# Patient Record
Sex: MALE | Race: WHITE | NOT HISPANIC OR LATINO | Employment: PART TIME | URBAN - METROPOLITAN AREA
[De-identification: names, ages, dates, MRNs, and addresses within clinical notes are randomized per-mention and may not be internally consistent; named-entity substitution may affect disease eponyms.]

---

## 2023-05-09 PROBLEM — R41.840 DIFFICULTY CONCENTRATING: Status: ACTIVE | Noted: 2023-05-09

## 2023-05-09 PROBLEM — S61.219A FINGER LACERATION, INITIAL ENCOUNTER: Status: ACTIVE | Noted: 2023-05-09

## 2023-05-09 PROBLEM — F41.9 ANXIETY: Status: ACTIVE | Noted: 2023-05-09

## 2023-05-09 PROBLEM — R53.83 FATIGUE: Status: ACTIVE | Noted: 2023-05-09

## 2023-05-09 PROBLEM — G47.00 INSOMNIA: Status: ACTIVE | Noted: 2023-05-09

## 2023-05-09 PROBLEM — F90.9 ADHD: Status: ACTIVE | Noted: 2023-05-09

## 2023-05-09 PROBLEM — F32.A DEPRESSION: Status: ACTIVE | Noted: 2023-05-09

## 2023-05-09 RX ORDER — SERTRALINE HYDROCHLORIDE 50 MG/1
1 TABLET, FILM COATED ORAL DAILY
COMMUNITY
Start: 2021-01-20 | End: 2024-01-17 | Stop reason: ALTCHOICE

## 2023-05-09 RX ORDER — HYDROXYZINE HYDROCHLORIDE 25 MG/1
1 TABLET, FILM COATED ORAL NIGHTLY
COMMUNITY
Start: 2021-03-03 | End: 2024-01-17 | Stop reason: ALTCHOICE

## 2023-05-09 RX ORDER — METHYLPHENIDATE HYDROCHLORIDE 18 MG/1
TABLET ORAL
COMMUNITY
Start: 2021-03-03 | End: 2024-01-17 | Stop reason: ALTCHOICE

## 2023-05-10 ENCOUNTER — APPOINTMENT (OUTPATIENT)
Dept: PRIMARY CARE | Facility: CLINIC | Age: 23
End: 2023-05-10

## 2024-01-17 ENCOUNTER — LAB (OUTPATIENT)
Dept: LAB | Facility: LAB | Age: 24
End: 2024-01-17
Payer: OTHER GOVERNMENT

## 2024-01-17 ENCOUNTER — OFFICE VISIT (OUTPATIENT)
Dept: PRIMARY CARE | Facility: CLINIC | Age: 24
End: 2024-01-17
Payer: OTHER GOVERNMENT

## 2024-01-17 VITALS
DIASTOLIC BLOOD PRESSURE: 70 MMHG | HEART RATE: 93 BPM | TEMPERATURE: 97.4 F | HEIGHT: 71 IN | BODY MASS INDEX: 22.96 KG/M2 | WEIGHT: 164 LBS | OXYGEN SATURATION: 97 % | SYSTOLIC BLOOD PRESSURE: 108 MMHG

## 2024-01-17 DIAGNOSIS — Z00.00 HEALTHCARE MAINTENANCE: ICD-10-CM

## 2024-01-17 DIAGNOSIS — R09.82 POSTNASAL DRIP: ICD-10-CM

## 2024-01-17 DIAGNOSIS — Q85.01 TYPE 1 NEUROFIBROMATOSIS (MULTI): ICD-10-CM

## 2024-01-17 DIAGNOSIS — R05.3 PERSISTENT COUGH: Primary | ICD-10-CM

## 2024-01-17 LAB
ALBUMIN SERPL BCP-MCNC: 4.6 G/DL (ref 3.4–5)
ALP SERPL-CCNC: 56 U/L (ref 33–120)
ALT SERPL W P-5'-P-CCNC: 21 U/L (ref 10–52)
ANION GAP SERPL CALC-SCNC: 13 MMOL/L (ref 10–20)
AST SERPL W P-5'-P-CCNC: 17 U/L (ref 9–39)
BILIRUB SERPL-MCNC: 0.6 MG/DL (ref 0–1.2)
BUN SERPL-MCNC: 27 MG/DL (ref 6–23)
CALCIUM SERPL-MCNC: 9.2 MG/DL (ref 8.6–10.3)
CHLORIDE SERPL-SCNC: 108 MMOL/L (ref 98–107)
CHOLEST SERPL-MCNC: 138 MG/DL (ref 0–199)
CHOLESTEROL/HDL RATIO: 3.9
CO2 SERPL-SCNC: 23 MMOL/L (ref 21–32)
CREAT SERPL-MCNC: 0.91 MG/DL (ref 0.5–1.3)
EGFRCR SERPLBLD CKD-EPI 2021: >90 ML/MIN/1.73M*2
ERYTHROCYTE [DISTWIDTH] IN BLOOD BY AUTOMATED COUNT: 12.8 % (ref 11.5–14.5)
GLUCOSE SERPL-MCNC: 76 MG/DL (ref 74–99)
HCT VFR BLD AUTO: 50.2 % (ref 41–52)
HDLC SERPL-MCNC: 35.7 MG/DL
HGB BLD-MCNC: 16.9 G/DL (ref 13.5–17.5)
LDLC SERPL CALC-MCNC: 89 MG/DL
MCH RBC QN AUTO: 29.8 PG (ref 26–34)
MCHC RBC AUTO-ENTMCNC: 33.7 G/DL (ref 32–36)
MCV RBC AUTO: 89 FL (ref 80–100)
NON HDL CHOLESTEROL: 102 MG/DL (ref 0–149)
NRBC BLD-RTO: 0 /100 WBCS (ref 0–0)
PLATELET # BLD AUTO: 277 X10*3/UL (ref 150–450)
POTASSIUM SERPL-SCNC: 4.5 MMOL/L (ref 3.5–5.3)
PROT SERPL-MCNC: 6.8 G/DL (ref 6.4–8.2)
RBC # BLD AUTO: 5.67 X10*6/UL (ref 4.5–5.9)
SODIUM SERPL-SCNC: 139 MMOL/L (ref 136–145)
TRIGL SERPL-MCNC: 65 MG/DL (ref 0–149)
VLDL: 13 MG/DL (ref 0–40)
WBC # BLD AUTO: 7.2 X10*3/UL (ref 4.4–11.3)

## 2024-01-17 PROCEDURE — 80061 LIPID PANEL: CPT

## 2024-01-17 PROCEDURE — 80053 COMPREHEN METABOLIC PANEL: CPT

## 2024-01-17 PROCEDURE — 85027 COMPLETE CBC AUTOMATED: CPT

## 2024-01-17 PROCEDURE — 36415 COLL VENOUS BLD VENIPUNCTURE: CPT

## 2024-01-17 PROCEDURE — 1036F TOBACCO NON-USER: CPT | Performed by: NURSE PRACTITIONER

## 2024-01-17 PROCEDURE — 99213 OFFICE O/P EST LOW 20 MIN: CPT | Performed by: NURSE PRACTITIONER

## 2024-01-17 RX ORDER — FLUTICASONE PROPIONATE 50 MCG
2 SPRAY, SUSPENSION (ML) NASAL DAILY
Qty: 16 G | Refills: 0 | Status: SHIPPED | OUTPATIENT
Start: 2024-01-17

## 2024-01-17 ASSESSMENT — ENCOUNTER SYMPTOMS
CHEST TIGHTNESS: 0
HEADACHES: 0
PHOTOPHOBIA: 0
RHINORRHEA: 0
NAUSEA: 0
DIARRHEA: 0
EYE DISCHARGE: 0
SHORTNESS OF BREATH: 0
ABDOMINAL PAIN: 0
SPEECH DIFFICULTY: 0
FEVER: 0
COUGH: 1
SORE THROAT: 0
WHEEZING: 0
VOMITING: 0
NUMBNESS: 0
BLOOD IN STOOL: 0
FATIGUE: 0
CONSTIPATION: 0
SINUS PRESSURE: 0
CHILLS: 0
DIZZINESS: 0
PALPITATIONS: 0
EYE REDNESS: 0
WEAKNESS: 0
UNEXPECTED WEIGHT CHANGE: 0
EYE ITCHING: 0
EYE PAIN: 0

## 2024-01-17 ASSESSMENT — PATIENT HEALTH QUESTIONNAIRE - PHQ9
SUM OF ALL RESPONSES TO PHQ9 QUESTIONS 1 AND 2: 0
1. LITTLE INTEREST OR PLEASURE IN DOING THINGS: NOT AT ALL
2. FEELING DOWN, DEPRESSED OR HOPELESS: NOT AT ALL

## 2024-01-17 NOTE — PROGRESS NOTES
"Subjective    Jake Ballard is a 23 y.o. male who presents for Referral (For neurology ) and Cough.    HPI  He presents to the office with mom for evaluation of a cough and also a referral to neurology.  He reports about 7 months ago had COVID-he had a runny nose, aches, cough, body aches.   Didn't feel good for about 1-2 weeks.   Then symptoms resolved.  Pretty shortly after started with a dry cough   (+) PND  (+) frequent throat clearing.   No SOB or wheezing.   Cough is consistent all day long.  Seems to be worse after up for a while.   No nasal congestion or runny nose.   No burning in chest or throat.  No sour taste in his mouth.  No abdominal pain, nausea or vomiting.   No fever or chills.  No sweating at night.  No regular exercise.   Mom is requesting an x-ray today given the length of symptoms.    He also has neurofibromatosis type 1.  Mom is requesting a referral to neurology to get updated scans.  He has no headaches, numbness, tingling, weakness.  No symptoms reported.    Review of Systems   Constitutional:  Negative for chills, fatigue, fever and unexpected weight change.   HENT:  Positive for postnasal drip. Negative for congestion, ear pain, hearing loss, nosebleeds, rhinorrhea, sinus pressure, sore throat and tinnitus.    Eyes:  Negative for photophobia, pain, discharge, redness, itching and visual disturbance.   Respiratory:  Positive for cough. Negative for chest tightness, shortness of breath and wheezing.    Cardiovascular:  Negative for chest pain, palpitations and leg swelling.   Gastrointestinal:  Negative for abdominal pain, blood in stool, constipation, diarrhea, nausea and vomiting.   Neurological:  Negative for dizziness, syncope, speech difficulty, weakness, numbness and headaches.       Objective   /70 (BP Location: Left arm, Patient Position: Sitting)   Pulse 93   Temp 36.3 °C (97.4 °F) (Temporal)   Ht 1.798 m (5' 10.79\")   Wt 74.4 kg (164 lb)   SpO2 97%   BMI 23.01 kg/m² "     Physical Exam  Constitutional:       General: He is not in acute distress.     Appearance: Normal appearance. He is not toxic-appearing.   HENT:      Right Ear: Hearing, tympanic membrane, ear canal and external ear normal.      Left Ear: Hearing, tympanic membrane, ear canal and external ear normal.      Nose: Nose normal.      Right Sinus: No maxillary sinus tenderness or frontal sinus tenderness.      Left Sinus: No maxillary sinus tenderness or frontal sinus tenderness.      Mouth/Throat:      Mouth: Mucous membranes are moist.      Pharynx: No pharyngeal swelling or posterior oropharyngeal erythema.   Cardiovascular:      Rate and Rhythm: Normal rate and regular rhythm.      Heart sounds: Normal heart sounds, S1 normal and S2 normal. No murmur heard.  Pulmonary:      Effort: Pulmonary effort is normal.      Breath sounds: Normal breath sounds and air entry.   Lymphadenopathy:      Cervical: No cervical adenopathy.   Neurological:      Mental Status: He is alert and oriented to person, place, and time.   Psychiatric:         Mood and Affect: Mood normal.         Behavior: Behavior normal.         Thought Content: Thought content normal.         Judgment: Judgment normal.         Assessment/Plan   Problem List Items Addressed This Visit       Type 1 neurofibromatosis (CMS/HCC) - Primary    Relevant Orders    Referral to Neurology    Comprehensive Metabolic Panel (Completed)    CBC (Completed)    Persistent cough     Suspect due to postnasal drip.  Will check a chest x-ray.  Advised to use fluticasone nasal spray for the next 3 to 4 weeks to see if symptoms improve.  He is let me know if no improvement of symptoms         Relevant Medications    fluticasone (Flonase) 50 mcg/actuation nasal spray    Other Relevant Orders    XR chest 2 views    Postnasal drip    Relevant Medications    fluticasone (Flonase) 50 mcg/actuation nasal spray     Other Visit Diagnoses       Healthcare maintenance        Relevant Orders     Lipid Panel (Completed)            It has been a pleasure seeing you today!

## 2024-01-17 NOTE — PATIENT INSTRUCTIONS
Obtain the fasting labs as ordered today.  Obtain the chest x-ray as ordered today.  Begin using Flonase nasal spray.

## 2024-01-18 ENCOUNTER — TELEPHONE (OUTPATIENT)
Dept: PRIMARY CARE | Facility: CLINIC | Age: 24
End: 2024-01-18
Payer: OTHER GOVERNMENT

## 2024-01-18 NOTE — TELEPHONE ENCOUNTER
----- Message from AURA James sent at 1/17/2024 10:43 PM EST -----  Please let him know his labs look good overall.  His BUN (kidney function) was slightly elevated.  He should be sure he is drinking plenty of water.

## 2024-01-18 NOTE — ASSESSMENT & PLAN NOTE
Suspect due to postnasal drip.  Will check a chest x-ray.  Advised to use fluticasone nasal spray for the next 3 to 4 weeks to see if symptoms improve.  He is let me know if no improvement of symptoms

## 2024-01-18 NOTE — TELEPHONE ENCOUNTER
Per HIPAA ok to leave detailed message, LM on pt voicemail with results. He should call back if he has any questions or concerns.

## 2024-01-26 DIAGNOSIS — Q85.01 TYPE 1 NEUROFIBROMATOSIS (MULTI): Primary | ICD-10-CM
